# Patient Record
Sex: FEMALE | Race: WHITE | NOT HISPANIC OR LATINO | Employment: FULL TIME | URBAN - METROPOLITAN AREA
[De-identification: names, ages, dates, MRNs, and addresses within clinical notes are randomized per-mention and may not be internally consistent; named-entity substitution may affect disease eponyms.]

---

## 2017-03-24 ENCOUNTER — HOSPITAL ENCOUNTER (OUTPATIENT)
Dept: RADIOLOGY | Facility: HOSPITAL | Age: 52
Discharge: HOME | End: 2017-03-24
Attending: NURSE PRACTITIONER
Payer: COMMERCIAL

## 2017-03-24 ENCOUNTER — OFFICE VISIT (OUTPATIENT)
Dept: INTERNAL MEDICINE | Facility: CLINIC | Age: 52
End: 2017-03-24
Payer: COMMERCIAL

## 2017-03-24 ENCOUNTER — OFFICE VISIT (OUTPATIENT)
Dept: ORTHOPEDICS | Facility: CLINIC | Age: 52
End: 2017-03-24
Payer: COMMERCIAL

## 2017-03-24 VITALS
HEIGHT: 67 IN | SYSTOLIC BLOOD PRESSURE: 120 MMHG | BODY MASS INDEX: 29.34 KG/M2 | TEMPERATURE: 97 F | HEART RATE: 83 BPM | DIASTOLIC BLOOD PRESSURE: 68 MMHG | OXYGEN SATURATION: 99 % | WEIGHT: 186.94 LBS

## 2017-03-24 VITALS
HEIGHT: 66 IN | WEIGHT: 185.19 LBS | BODY MASS INDEX: 29.76 KG/M2 | HEART RATE: 82 BPM | DIASTOLIC BLOOD PRESSURE: 75 MMHG | SYSTOLIC BLOOD PRESSURE: 115 MMHG

## 2017-03-24 DIAGNOSIS — M79.641 RIGHT HAND PAIN: ICD-10-CM

## 2017-03-24 DIAGNOSIS — T14.8XXA SKIN AVULSION: ICD-10-CM

## 2017-03-24 DIAGNOSIS — M79.644 PAIN OF FINGER OF RIGHT HAND: ICD-10-CM

## 2017-03-24 DIAGNOSIS — M25.531 RIGHT WRIST PAIN: ICD-10-CM

## 2017-03-24 DIAGNOSIS — S63.501A RIGHT WRIST SPRAIN, INITIAL ENCOUNTER: Primary | ICD-10-CM

## 2017-03-24 DIAGNOSIS — F41.9 ANXIETY: ICD-10-CM

## 2017-03-24 DIAGNOSIS — S63.612A SPRAIN OF RIGHT MIDDLE FINGER, INITIAL ENCOUNTER: ICD-10-CM

## 2017-03-24 DIAGNOSIS — S63.614A SPRAIN OF RIGHT RING FINGER, INITIAL ENCOUNTER: ICD-10-CM

## 2017-03-24 DIAGNOSIS — M79.641 RIGHT HAND PAIN: Primary | ICD-10-CM

## 2017-03-24 PROCEDURE — 99213 OFFICE O/P EST LOW 20 MIN: CPT | Mod: S$GLB,,, | Performed by: NURSE PRACTITIONER

## 2017-03-24 PROCEDURE — 3078F DIAST BP <80 MM HG: CPT | Mod: S$GLB,,, | Performed by: PHYSICIAN ASSISTANT

## 2017-03-24 PROCEDURE — 3078F DIAST BP <80 MM HG: CPT | Mod: S$GLB,,, | Performed by: NURSE PRACTITIONER

## 2017-03-24 PROCEDURE — 73130 X-RAY EXAM OF HAND: CPT | Mod: TC,PO,RT

## 2017-03-24 PROCEDURE — 99203 OFFICE O/P NEW LOW 30 MIN: CPT | Mod: S$GLB,,, | Performed by: PHYSICIAN ASSISTANT

## 2017-03-24 PROCEDURE — 1160F RVW MEDS BY RX/DR IN RCRD: CPT | Mod: S$GLB,,, | Performed by: NURSE PRACTITIONER

## 2017-03-24 PROCEDURE — 1160F RVW MEDS BY RX/DR IN RCRD: CPT | Mod: S$GLB,,, | Performed by: PHYSICIAN ASSISTANT

## 2017-03-24 PROCEDURE — 3074F SYST BP LT 130 MM HG: CPT | Mod: S$GLB,,, | Performed by: NURSE PRACTITIONER

## 2017-03-24 PROCEDURE — 73130 X-RAY EXAM OF HAND: CPT | Mod: 26,RT,, | Performed by: RADIOLOGY

## 2017-03-24 PROCEDURE — 3074F SYST BP LT 130 MM HG: CPT | Mod: S$GLB,,, | Performed by: PHYSICIAN ASSISTANT

## 2017-03-24 PROCEDURE — 73110 X-RAY EXAM OF WRIST: CPT | Mod: TC,PO,RT

## 2017-03-24 PROCEDURE — 99999 PR PBB SHADOW E&M-EST. PATIENT-LVL V: CPT | Mod: PBBFAC,,, | Performed by: NURSE PRACTITIONER

## 2017-03-24 PROCEDURE — 73110 X-RAY EXAM OF WRIST: CPT | Mod: 26,RT,, | Performed by: RADIOLOGY

## 2017-03-24 PROCEDURE — 99999 PR PBB SHADOW E&M-EST. PATIENT-LVL III: CPT | Mod: PBBFAC,,, | Performed by: PHYSICIAN ASSISTANT

## 2017-03-24 RX ORDER — MELOXICAM 15 MG/1
15 TABLET ORAL DAILY
Qty: 30 TABLET | Refills: 0 | Status: SHIPPED | OUTPATIENT
Start: 2017-03-24 | End: 2018-01-05

## 2017-03-24 RX ORDER — MUPIROCIN 20 MG/G
OINTMENT TOPICAL 3 TIMES DAILY
Qty: 30 G | Refills: 0 | Status: SHIPPED | OUTPATIENT
Start: 2017-03-24 | End: 2017-04-03

## 2017-03-24 RX ORDER — ALPRAZOLAM 0.25 MG/1
0.25 TABLET ORAL 2 TIMES DAILY PRN
Qty: 15 TABLET | Refills: 0 | Status: SHIPPED | OUTPATIENT
Start: 2017-03-24 | End: 2018-01-05
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
## 2017-03-24 NOTE — MR AVS SNAPSHOT
[facility]  [address] [place] [address]  Phone: [phone]  Fax: [phone]                  Lola Nickerson Maria Victoria   3/24/2017 11:00 AM   Office Visit    Description:  Female : 1965   Provider:  PURA Small   Department:  [facility]           Reason for Visit     fell on right hand           Diagnoses this Visit        Comments    Right hand pain    -  Primary     Pain of finger of right hand         Skin avulsion         Right wrist pain         Closed nondisplaced fracture of phalanx of finger, unspecified finger, unspecified phalanx, initial encounter         Anxiety                To Do List           Future Appointments        Provider Department Dept Phone    3/24/2017 12:15 PM [facility] XR2 [facility] [phone]    3/24/2017 1:30 PM Pamella Macario PA-C [facility] [phone]      Goals (5 Years of Data)     None      Follow-Up and Disposition     Return if symptoms worsen or fail to improve.       These Medications        Disp Refills Start End    mupirocin (BACTROBAN) 2 % ointment 30 g 0 3/24/2017 4/3/2017    Apply topically 3 (three) times daily. - Topical (Top)    Pharmacy: [facility] [address] [place] [address] [address] [facility] Ph #: [phone]       alprazolam (XANAX) 0.25 MG tablet 15 tablet 0 3/24/2017     Take 1 tablet (0.25 mg total) by mouth 2 (two) times daily as needed for Anxiety. - Oral    Pharmacy: [facility] [address] [place] [address] [address] AT [facility] Ph #: [phone]         [facility] On Call     [facility] On Call Nurse Care Line -  Assistance  Registered nurses in the [facility] Center provide clinical advisement, health education, appointment booking, and other advisory services.  Call for this free service at [phone].             Medications           Message  "regarding Medications     Verify the changes and/or additions to your medication regime listed below are the same as discussed with your clinician today.  If any of these changes or additions are incorrect, please notify your healthcare provider.        START taking these NEW medications        Refills    mupirocin (BACTROBAN) 2 % ointment 0    Sig: Apply topically 3 (three) times daily.    Class: Normal    Route: Topical (Top)    alprazolam (XANAX) 0.25 MG tablet 0    Sig: Take 1 tablet (0.25 mg total) by mouth 2 (two) times daily as needed for Anxiety.    Class: Print    Route: Oral           Verify that the below list of medications is an accurate representation of the medications you are currently taking.  If none reported, the list may be blank. If incorrect, please contact your healthcare provider. Carry this list with you in case of emergency.           Current Medications     alprazolam (XANAX) 0.25 MG tablet Take 1 tablet (0.25 mg total) by mouth 2 (two) times daily as needed for Anxiety.    aspirin (ECOTRIN) 81 MG EC tablet Take 81 mg by mouth once daily.    cyclobenzaprine (FLEXERIL) 10 MG tablet Take 10 mg by mouth every evening.     lisinopril-hydrochlorothiazide (PRINZIDE,ZESTORETIC) 20-12.5 mg per tablet TAKE 1 TABLET BY MOUTH EVERY DAY    lisinopril-hydrochlorothiazide (PRINZIDE,ZESTORETIC) 20-12.5 mg per tablet TAKE 1 TABLET BY MOUTH EVERY DAY    mupirocin (BACTROBAN) 2 % ointment Apply topically 3 (three) times daily.           Clinical Reference Information           Your Vitals Were     BP Pulse Temp Height    120/68 (BP Location: Right arm, Patient Position: Sitting, BP Method: Manual) 83 97.2 °F (36.2 °C) (Tympanic) 5' 6.5" (1.689 m)    Weight Last Period SpO2 BMI    84.8 kg (186 lb 15.2 oz) 03/10/2017 99% 29.72 kg/m2      Blood Pressure          Most Recent Value    BP  120/68      Allergies as of 3/24/2017     Flagyl [Metronidazole Hcl]    Norvasc [Amlodipine]    Wellbutrin [Bupropion Hcl]    "   Immunizations Administered on Date of Encounter - 3/24/2017     None      Orders Placed During Today's Visit      Normal Orders This Visit    Ambulatory referral to Orthopedics     Ambulatory referral to Orthopedics     Future Labs/Procedures Expected by Expires    X-Ray Hand 3 view Right  3/24/2017 6/22/2017    X-Ray Wrist 2 View Right  3/24/2017 6/22/2017      Smoking Cessation     If you would like to quit smoking:   You may be eligible for free services if you are a [place] resident and started smoking cigarettes before September 1, 1988.  Call the Smoking Cessation Trust ([employer]) toll free at (243) 955-8884 or (377) 936-5914.   Call 8-872-QUIT-NOW if you do not meet the above criteria.            Language Assistance Services     ATTENTION: Language assistance services are available, free of charge. Please call [phone].      ATENCIÓN: Si robyn deleon, tiene a tesfaye disposición servicios gratuitos de asistencia lingüística. Llame al [phone].     CHÚ Ý: N?u b?n nói Ti?ng Vi?t, có các d?ch v? h? tr? ngôn ng? mi?n phí dành cho b?n. G?i s? [phone].         [facility] complies with applicable Federal civil rights laws and does not discriminate on the basis of race, color, national origin, age, disability, or sex.

## 2017-03-24 NOTE — MR AVS SNAPSHOT
[facility]  [address] [place] [address]  Phone: [phone]  Fax: [phone]                  Lola Noriegabb   3/24/2017 1:30 PM   Office Visit    Description:  Female : 1965   Provider:  Pamella Macario PA-C   Department:  [facility]           Reason for Visit     Right Hand - Pain           Diagnoses this Visit        Comments    Right wrist sprain, initial encounter    -  Primary     Sprain of right middle finger, initial encounter         Sprain of right ring finger, initial encounter                To Do List           Future Appointments        Provider Department Dept Phone    2017 11:15 AM Pamella Macario PA-C [facility] [phone]      Goals (5 Years of Data)     None       These Medications        Disp Refills Start End    meloxicam (MOBIC) 15 MG tablet 30 tablet 0 3/24/2017     Take 1 tablet (15 mg total) by mouth once daily. - Oral    Pharmacy: [facility] [address] [place] [address] [address] [facility] Ph #: [phone]         [facility] On Call     [facility] On Call Nurse [facility] -  Assistance  Registered nurses in the [facility] provide clinical advisement, health education, appointment booking, and other advisory services.  Call for this free service at [phone].             Medications           Message regarding Medications     Verify the changes and/or additions to your medication regime listed below are the same as discussed with your clinician today.  If any of these changes or additions are incorrect, please notify your healthcare provider.        START taking these NEW medications        Refills    meloxicam (MOBIC) 15 MG tablet 0    Sig: Take 1 tablet (15 mg total) by mouth once daily.    Class: Normal    Route: Oral           Verify that the below list of medications is an accurate representation of the medications you are currently  "taking.  If none reported, the list may be blank. If incorrect, please contact your healthcare provider. Carry this list with you in case of emergency.           Current Medications     alprazolam (XANAX) 0.25 MG tablet Take 1 tablet (0.25 mg total) by mouth 2 (two) times daily as needed for Anxiety.    aspirin (ECOTRIN) 81 MG EC tablet Take 81 mg by mouth once daily.    cyclobenzaprine (FLEXERIL) 10 MG tablet Take 10 mg by mouth every evening.     lisinopril-hydrochlorothiazide (PRINZIDE,ZESTORETIC) 20-12.5 mg per tablet TAKE 1 TABLET BY MOUTH EVERY DAY    meloxicam (MOBIC) 15 MG tablet Take 1 tablet (15 mg total) by mouth once daily.    mupirocin (BACTROBAN) 2 % ointment Apply topically 3 (three) times daily.           Clinical Reference Information           Your Vitals Were     BP Pulse Height Weight Last Period BMI    115/75 (BP Location: Left arm, Patient Position: Sitting, BP Method: Automatic) 82 5' 6" (1.676 m) 84 kg (185 lb 3 oz) 03/10/2017 29.89 kg/m2      Blood Pressure          Most Recent Value    BP  115/75      Allergies as of 3/24/2017     Flagyl [Metronidazole Hcl]    Norvasc [Amlodipine]    Wellbutrin [Bupropion Hcl]      Immunizations Administered on Date of Encounter - 3/24/2017     None      Smoking Cessation     If you would like to quit smoking:   You may be eligible for free services if you are a [place] resident and started smoking cigarettes before September 1, 1988.  Call the Smoking Cessation Trust ([facility]) toll free at (930) 763-4963 or (575) 036-4201.   Call 1-800-QUIT-NOW if you do not meet the above criteria.            Language Assistance Services     ATTENTION: Language assistance services are available, free of charge. Please call [phone].      ATENCIÓN: Si habla adrienne, tiene a tesfaye disposición servicios gratuitos de asistencia lingüística. Llame al [phone].     CHÚ Ý: N?u b?n nói Ti?ng Vi?t, có các d?ch v? h? tr? ngôn ng? mi?n phí dành cho b?n. G?i s? " [phone].         [facility] complies with applicable Federal civil rights laws and does not discriminate on the basis of race, color, national origin, age, disability, or sex.

## 2017-03-24 NOTE — LETTER
March 24, 2017      Katarzyna Britt, [facility]  [address] [place] [address]           [facility]  [address] [place] [address]  Phone: [phone]  Fax: [phone]          Patient: Lola Irene   MR Number: [id number]   YOB: 1965   Date of Visit: 3/24/2017       Dear Katarzyna Britt:    Thank you for referring Lola Irene to me for evaluation. Attached you will find relevant portions of my assessment and plan of care.    If you have questions, please do not hesitate to call me. I look forward to following Lola Irene along with you.    Sincerely,    BRIAN Gross  CC:  No Recipients    If you would like to receive this communication electronically, please contact [email or web address] or [phone] to request more information on [facility] Link access.    For providers and/or their staff who would like to refer a patient to [facility], please contact us through our one-stop-shop provider referral line, [facility] , at [phone].    If you feel you have received this communication in error or would no longer like to receive these types of communications, please e-mail [email or web address]

## 2017-03-24 NOTE — PROGRESS NOTES
Subjective:      Patient ID: Lola Irene is a 51 y.o. female.    Chief Complaint: Pain of the Right Hand      HPI: Lola Irene  is a 51 y.o. female who c/o Pain of the Right Hand   for duration of one week.  She had a fall one week ago injuring the fourth finger.  She does on to tell me that the nurse practitioner who saw her at [facility] after our saw a fracture of the finger, however, when the radiologist's read the report, he did not know take a fracture.  She has been using a splint, but unfortunately she had another injury today when she slipped and fell.  She was seen downstairs by Katarzyna Britt who set her up to me for further orthopedic evaluation.  Patient voiced some frustration at having to be seen today and pain to different co-pays today.  Her pain level is 5 out of 10 in severity.  Quality is aching and constant.  It was improved on Norco.  It was also improved with immobilization.  Aggravating factors include movement.  The worst of her pain is the right ring finger.  However, she also has some discomfort in the right middle finger as well as the right wrist.  She complains of decreased range of motion in both the fourth and third fingers.  She complains of associated swelling and ecchymosis in the third and fourth fingers as well.    Review of Systems   Constitution: Negative for fever.   HENT: Negative for headaches.    Cardiovascular: Negative for chest pain.   Respiratory: Negative for cough and shortness of breath.    Skin: Negative for rash.   Musculoskeletal: Positive for joint pain, joint swelling and stiffness.   Gastrointestinal: Negative for heartburn.   Neurological: Negative for numbness.         Objective:        General    Nursing note and vitals reviewed.  Constitutional: She is oriented to person, place, and time. She appears well-developed and well-nourished.   HENT:   Head: Normocephalic and atraumatic.   Eyes: EOM are normal.   Cardiovascular: Normal rate and regular rhythm.     Pulmonary/Chest: Effort normal.   Abdominal: Soft.   Neurological: She is alert and oriented to person, place, and time.   Psychiatric: She has a normal mood and affect. Her behavior is normal.             Right Hand/Wrist Exam     Tests     Atrophy   Thenar:  negative  Hypothenar:  negative  Intrinsic:  negative  1st Dorsal Interosseous: negative    Other     Neuorologic Exam    Median Distribution: normal  Ulnar Distribution: normal  Radial Distribution: normal    Comments:  She is neurovascularly intact.  She has a 2+ radial pulse.  Sensation is intact to all digits.  She has swelling and ecchymosis noted within the right finger.  To a lesser extent she has swelling in the right total finger.  She has no swelling within the wrist.  She has some tenderness to palpation along the dorsal aspect of the distal radius.  She has no snuffbox tenderness.  No tenderness over the ulna nor the TFCC.  Mild diffuse tenderness within the carpals of the wrist.  She has no tenderness over the metacarpals of the hand.  She has exquisite tenderness to palpation over the phalanx of the right ring finger.  She has decreased range of motion in the third and fourth fingers, however, flexion and extension are intact to the MCP, PIP, and DIP joints.          Vascular Exam       Capillary Refill  Right Hand: normal capillary refill            Xray:   Right hand and wrist from today images and report were reviewed today.  I agree with the radiologist's interpretation.  There is no evidence to suggest acute fracture or dislocation.  The joint spaces appear to be well-maintained.  On independent review of the x-ray, she does have a cortical irregularity on the radial aspect of the proximal phalanx of the right ring finger.  There is no obvious fracture line in this area.  However, it does correlate clinically to her area of maximal tenderness.    Assessment:       Encounter Diagnoses   Name Primary?    Right wrist sprain, initial  encounter Yes    Sprain of right middle finger, initial encounter     Sprain of right ring finger, initial encounter           Plan:       Lola was seen today for pain.    Diagnoses and all orders for this visit:    Right wrist sprain, initial encounter  -     meloxicam (MOBIC) 15 MG tablet; Take 1 tablet (15 mg total) by mouth once daily.    Sprain of right middle finger, initial encounter  -     meloxicam (MOBIC) 15 MG tablet; Take 1 tablet (15 mg total) by mouth once daily.    Sprain of right ring finger, initial encounter  -     meloxicam (MOBIC) 15 MG tablet; Take 1 tablet (15 mg total) by mouth once daily.    Lola is in today to be evaluated for new problems as above.  I recommend a wrist splint for immobilization to help with pain in the wrist.  I also recommend german taping the third and fourth fingers.  She has a metal finger splint that she can use for more protection of the right ring finger for the next couple of days.  Ultimately I like to get her into german taping and working gentle motion of the fingers.  I like to see her back in the office in 2 weeks.  If her pain is not improving, I would recommend repeat x-rays of the hand to further evaluate the right ring finger.  In the meantime, I have prescribed meloxicam 1 by mouth daily with food.  If she has any problems prior to follow-up, she will notify the office.  Patient verbalizes understanding and agrees with the above plan.    Return in about 2 weeks (around 4/7/2017).          The patient understands, chooses and consents to this plan and accepts all   the risks which include but are not limited to the risks mentioned above.     Disclaimer: This note was prepared using a voice recognition system and is likely to have sound alike errors within the text.

## 2017-07-05 RX ORDER — LISINOPRIL AND HYDROCHLOROTHIAZIDE 12.5; 2 MG/1; MG/1
TABLET ORAL
Qty: 30 TABLET | Refills: 1 | Status: SHIPPED | OUTPATIENT
Start: 2017-07-05 | End: 2017-09-08 | Stop reason: SDUPTHER

## 2017-09-08 NOTE — TELEPHONE ENCOUNTER
"5/16  Pt states "yea, I know, are you going to fill this or not? if not I will just stop taking it. I am not coming back in until the balance I owe for the stupid finger incident is paid off."  "

## 2017-09-11 RX ORDER — LISINOPRIL AND HYDROCHLOROTHIAZIDE 12.5; 2 MG/1; MG/1
1 TABLET ORAL DAILY
Qty: 30 TABLET | Refills: 1 | Status: SHIPPED | OUTPATIENT
Start: 2017-09-11 | End: 2017-11-13 | Stop reason: SDUPTHER

## 2017-11-13 ENCOUNTER — PATIENT MESSAGE (OUTPATIENT)
Dept: INTERNAL MEDICINE | Facility: CLINIC | Age: 52
End: 2017-11-13

## 2017-11-13 RX ORDER — LISINOPRIL AND HYDROCHLOROTHIAZIDE 12.5; 2 MG/1; MG/1
1 TABLET ORAL DAILY
Qty: 30 TABLET | Refills: 1 | Status: SHIPPED | OUTPATIENT
Start: 2017-11-13 | End: 2017-12-11 | Stop reason: SDUPTHER

## 2017-11-13 RX ORDER — LISINOPRIL AND HYDROCHLOROTHIAZIDE 12.5; 2 MG/1; MG/1
1 TABLET ORAL DAILY
Qty: 30 TABLET | Refills: 0 | Status: SHIPPED | OUTPATIENT
Start: 2017-11-13 | End: 2018-01-05 | Stop reason: SDUPTHER

## 2017-12-11 RX ORDER — LISINOPRIL AND HYDROCHLOROTHIAZIDE 12.5; 2 MG/1; MG/1
1 TABLET ORAL DAILY
Qty: 30 TABLET | Refills: 1 | Status: SHIPPED | OUTPATIENT
Start: 2017-12-11 | End: 2018-01-05 | Stop reason: SDUPTHER

## 2018-01-05 ENCOUNTER — OFFICE VISIT (OUTPATIENT)
Dept: INTERNAL MEDICINE | Facility: CLINIC | Age: 53
End: 2018-01-05
Payer: COMMERCIAL

## 2018-01-05 VITALS
TEMPERATURE: 98 F | DIASTOLIC BLOOD PRESSURE: 74 MMHG | OXYGEN SATURATION: 98 % | WEIGHT: 186.75 LBS | SYSTOLIC BLOOD PRESSURE: 110 MMHG | HEART RATE: 75 BPM | BODY MASS INDEX: 29.31 KG/M2 | HEIGHT: 67 IN

## 2018-01-05 DIAGNOSIS — Z12.11 SCREEN FOR COLON CANCER: ICD-10-CM

## 2018-01-05 DIAGNOSIS — I10 ESSENTIAL HYPERTENSION: ICD-10-CM

## 2018-01-05 DIAGNOSIS — Z00.00 ROUTINE HEALTH MAINTENANCE: Primary | ICD-10-CM

## 2018-01-05 DIAGNOSIS — F17.200 SMOKER: ICD-10-CM

## 2018-01-05 PROCEDURE — 99999 PR PBB SHADOW E&M-EST. PATIENT-LVL III: CPT | Mod: PBBFAC,,, | Performed by: FAMILY MEDICINE

## 2018-01-05 PROCEDURE — 99396 PREV VISIT EST AGE 40-64: CPT | Mod: S$GLB,,, | Performed by: FAMILY MEDICINE

## 2018-01-05 RX ORDER — TRAZODONE HYDROCHLORIDE 50 MG/1
50 TABLET ORAL NIGHTLY PRN
Qty: 90 TABLET | Refills: 3 | Status: SHIPPED | OUTPATIENT
Start: 2018-01-05 | End: 2019-08-20

## 2018-01-05 RX ORDER — LISINOPRIL AND HYDROCHLOROTHIAZIDE 12.5; 2 MG/1; MG/1
1 TABLET ORAL DAILY
Qty: 90 TABLET | Refills: 3 | Status: SHIPPED | OUTPATIENT
Start: 2018-01-05 | End: 2019-08-20
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
## 2018-01-06 LAB
BUN SERPL-MCNC: 15 MG/DL (ref 6–24)
BUN/CREAT SERPL: 15 (ref 9–23)
CALCIUM SERPL-MCNC: 10.3 MG/DL (ref 8.7–10.2)
CHLORIDE SERPL-SCNC: 95 MMOL/L (ref 96–106)
CHOLEST SERPL-MCNC: 206 MG/DL (ref 100–199)
CO2 SERPL-SCNC: 25 MMOL/L (ref 18–29)
CREAT SERPL-MCNC: 1 MG/DL (ref 0.57–1)
EGFR IF AFRICAN AMERICAN: 75 ML/MIN/1.73
EST. GFR  (NON AFRICAN AMERICAN): 65 ML/MIN/1.73
GLUCOSE SERPL-MCNC: 103 MG/DL (ref 65–99)
HDLC SERPL-MCNC: 55 MG/DL
LDLC SERPL CALC-MCNC: 121 MG/DL (ref 0–99)
POTASSIUM SERPL-SCNC: 4.4 MMOL/L (ref 3.5–5.2)
SODIUM SERPL-SCNC: 137 MMOL/L (ref 134–144)
TRIGL SERPL-MCNC: 152 MG/DL (ref 0–149)
VLDLC SERPL CALC-MCNC: 30 MG/DL (ref 5–40)

## 2019-03-01 ENCOUNTER — PATIENT OUTREACH (OUTPATIENT)
Dept: ADMINISTRATIVE | Facility: HOSPITAL | Age: 54
End: 2019-03-01

## 2019-03-01 NOTE — PROGRESS NOTES
Attempted to contact patient regarding scheduling for physical/annual exam.   No answer. Left a detailed voicemail message including call back number.

## 2019-04-01 ENCOUNTER — OFFICE VISIT (OUTPATIENT)
Dept: URGENT CARE | Facility: CLINIC | Age: 54
End: 2019-04-01
Payer: COMMERCIAL

## 2019-04-01 VITALS
BODY MASS INDEX: 30.24 KG/M2 | SYSTOLIC BLOOD PRESSURE: 138 MMHG | OXYGEN SATURATION: 98 % | HEART RATE: 81 BPM | HEIGHT: 67 IN | DIASTOLIC BLOOD PRESSURE: 92 MMHG | TEMPERATURE: 99 F | WEIGHT: 192.69 LBS

## 2019-04-01 DIAGNOSIS — L03.113 CELLULITIS OF RIGHT UPPER EXTREMITY: ICD-10-CM
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
## 2019-04-01 PROCEDURE — 3075F SYST BP GE 130 - 139MM HG: CPT | Mod: CPTII,S$GLB,, | Performed by: FAMILY MEDICINE

## 2019-04-01 PROCEDURE — 99214 PR OFFICE/OUTPT VISIT, EST, LEVL IV, 30-39 MIN: ICD-10-PCS | Mod: 25,S$GLB,, | Performed by: FAMILY MEDICINE

## 2019-04-01 PROCEDURE — 99214 OFFICE O/P EST MOD 30 MIN: CPT | Mod: 25,S$GLB,, | Performed by: FAMILY MEDICINE

## 2019-04-01 PROCEDURE — 99999 PR PBB SHADOW E&M-EST. PATIENT-LVL IV: CPT | Mod: PBBFAC,,, | Performed by: FAMILY MEDICINE

## 2019-04-01 PROCEDURE — 99999 PR PBB SHADOW E&M-EST. PATIENT-LVL IV: ICD-10-PCS | Mod: PBBFAC,,, | Performed by: FAMILY MEDICINE

## 2019-04-01 PROCEDURE — 3075F PR MOST RECENT SYSTOLIC BLOOD PRESS GE 130-139MM HG: ICD-10-PCS | Mod: CPTII,S$GLB,, | Performed by: FAMILY MEDICINE

## 2019-04-01 PROCEDURE — 3080F DIAST BP >= 90 MM HG: CPT | Mod: CPTII,S$GLB,, | Performed by: FAMILY MEDICINE

## 2019-04-01 PROCEDURE — 3008F PR BODY MASS INDEX (BMI) DOCUMENTED: ICD-10-PCS | Mod: CPTII,S$GLB,, | Performed by: FAMILY MEDICINE

## 2019-04-01 PROCEDURE — 3008F BODY MASS INDEX DOCD: CPT | Mod: CPTII,S$GLB,, | Performed by: FAMILY MEDICINE

## 2019-04-01 PROCEDURE — 96372 THER/PROPH/DIAG INJ SC/IM: CPT | Mod: S$GLB,,, | Performed by: FAMILY MEDICINE

## 2019-04-01 PROCEDURE — 96372 PR INJECTION,THERAP/PROPH/DIAG2ST, IM OR SUBCUT: ICD-10-PCS | Mod: S$GLB,,, | Performed by: FAMILY MEDICINE

## 2019-04-01 PROCEDURE — 3080F PR MOST RECENT DIASTOLIC BLOOD PRESSURE >= 90 MM HG: ICD-10-PCS | Mod: CPTII,S$GLB,, | Performed by: FAMILY MEDICINE

## 2019-04-01 RX ORDER — SULFAMETHOXAZOLE AND TRIMETHOPRIM 800; 160 MG/1; MG/1
1 TABLET ORAL 2 TIMES DAILY
Qty: 14 TABLET | Refills: 0 | Status: SHIPPED | OUTPATIENT
Start: 2019-04-01 | End: 2019-04-01 | Stop reason: CLARIF

## 2019-04-01 RX ORDER — DOXYCYCLINE 100 MG/1
100 CAPSULE ORAL EVERY 12 HOURS
Qty: 14 CAPSULE | Refills: 0 | Status: SHIPPED | OUTPATIENT
Start: 2019-04-01 | End: 2019-04-08

## 2019-04-01 RX ORDER — METHYLPREDNISOLONE ACETATE 80 MG/ML
80 INJECTION, SUSPENSION INTRA-ARTICULAR; INTRALESIONAL; INTRAMUSCULAR; SOFT TISSUE ONCE
Status: COMPLETED | OUTPATIENT
Start: 2019-04-01 | End: 2019-04-01

## 2019-04-01 RX ADMIN — METHYLPREDNISOLONE ACETATE 80 MG: 80 INJECTION, SUSPENSION INTRA-ARTICULAR; INTRALESIONAL; INTRAMUSCULAR; SOFT TISSUE at 12:04

## 2019-04-01 NOTE — LETTER
April 1, 2019      [facility]  [address]  [place] [address]  Phone: [phone]  Fax: [phone]       Patient: Lola Irene   YOB: 1965  Date of Visit: 04/01/2019    To Whom It May Concern:    Denisse Irene  was at [facility] on 04/01/2019. She may return to work/school on 4/2 with no restrictions. If you have any questions or concerns, or if I can be of further assistance, please do not hesitate to contact me.    Sincerely,    Celina Darnell MD

## 2019-04-01 NOTE — PATIENT INSTRUCTIONS
Over the counter benadryl 25 mg, 1-2 pills every 6 hours for itch  Refrain from scratching, use ice pack to sooth  Work excuse today

## 2019-04-01 NOTE — PROGRESS NOTES
"Subjective:       Patient ID: Lola Irene is a 53 y.o. female.    Chief Complaint: Insect Bite (located on right elbow )    BP (!) 138/92 (BP Location: Right arm, Patient Position: Sitting, BP Method: Medium (Manual))   Pulse 81   Temp 98.9 °F (37.2 °C) (Tympanic)   Ht 5' 7" (1.702 m)   Wt 87.4 kg (192 lb 10.9 oz)   SpO2 98%   BMI 30.18 kg/m²     HPI  Bitten on right elbow by some insect. Very itchy. Scratched all night. Now the area is hard and the redness is spreading    Review of Systems   Constitutional: Negative for chills and fever.   HENT: Negative for congestion and trouble swallowing.    Respiratory: Negative for shortness of breath.    Gastrointestinal: Negative for nausea and vomiting.   Skin: Positive for color change, rash and wound.       Objective:      Physical Exam   Constitutional: She is oriented to person, place, and time. She appears well-developed and well-nourished. No distress.   HENT:   Head: Normocephalic and atraumatic.   Eyes: Pupils are equal, round, and reactive to light. EOM are normal.   Neurological: She is alert and oriented to person, place, and time. No cranial nerve deficit.   Skin: Skin is warm and dry. She is not diaphoretic.   Right elbow:   3 by 3 cm erythematous induration with a center scab.   Irregular shaped erythema without induration roughly 10 by 8 cm extending to inner upper arm. A smaller area of erythema without induration of 5 by 7 cm extending to lower arm  All said area intensely pruritic   Nursing note and vitals reviewed.      Assessment:       1. Insect bite, initial encounter    2. Cellulitis of right upper extremity        Plan:     Lola was seen today for insect bite.    Diagnoses and all orders for this visit:    Insect bite, initial encounter  -     methylPREDNISolone acetate injection 80 mg    Cellulitis of right upper extremity  -     Discontinue: sulfamethoxazole-trimethoprim 800-160mg (BACTRIM DS) 800-160 mg Tab; Take 1 tablet by mouth " 2 (two) times daily. for 7 days  -     doxycycline (VIBRAMYCIN) 100 MG Cap; Take 1 capsule (100 mg total) by mouth every 12 (twelve) hours. for 7 days      Over the counter benadryl 25 mg, 1-2 pills every 6 hours for itch  Refrain from scratching, use ice pack to sooth  Work excuse today

## 2019-04-03 ENCOUNTER — PATIENT OUTREACH (OUTPATIENT)
Dept: ADMINISTRATIVE | Facility: HOSPITAL | Age: 54
End: 2019-04-03

## 2019-04-03 NOTE — PROGRESS NOTES
"Contacted patient.  Patient was irate. Patient states, " I'm in the middle of something. I will get back to you. I'm at work." Patient hung up.  "

## 2019-04-10 ENCOUNTER — PATIENT OUTREACH (OUTPATIENT)
Dept: ADMINISTRATIVE | Facility: HOSPITAL | Age: 54
End: 2019-04-10

## 2019-04-10 NOTE — PROGRESS NOTES
I have attempted without success to contact pt re scheduling an appt no answer, unable to leave Vm.

## 2019-07-30 ENCOUNTER — PATIENT OUTREACH (OUTPATIENT)
Dept: ADMINISTRATIVE | Facility: HOSPITAL | Age: 54
End: 2019-07-30

## 2019-08-06 ENCOUNTER — PATIENT OUTREACH (OUTPATIENT)
Dept: ADMINISTRATIVE | Facility: HOSPITAL | Age: 54
End: 2019-08-06

## 2019-08-20 ENCOUNTER — OFFICE VISIT (OUTPATIENT)
Dept: INTERNAL MEDICINE | Facility: CLINIC | Age: 54
End: 2019-08-20
Payer: COMMERCIAL

## 2019-08-20 VITALS
HEIGHT: 67 IN | TEMPERATURE: 96 F | WEIGHT: 189.63 LBS | SYSTOLIC BLOOD PRESSURE: 136 MMHG | DIASTOLIC BLOOD PRESSURE: 90 MMHG | HEART RATE: 73 BPM | BODY MASS INDEX: 29.76 KG/M2 | OXYGEN SATURATION: 98 %

## 2019-08-20 DIAGNOSIS — F43.21 GRIEVING: ICD-10-CM

## 2019-08-20 DIAGNOSIS — I10 ESSENTIAL HYPERTENSION: Primary | ICD-10-CM

## 2019-08-20 PROCEDURE — 99999 PR PBB SHADOW E&M-EST. PATIENT-LVL III: CPT | Mod: PBBFAC,,, | Performed by: NURSE PRACTITIONER

## 2019-08-20 PROCEDURE — 99214 OFFICE O/P EST MOD 30 MIN: CPT | Mod: S$GLB,,, | Performed by: NURSE PRACTITIONER

## 2019-08-20 PROCEDURE — 3008F PR BODY MASS INDEX (BMI) DOCUMENTED: ICD-10-PCS | Mod: CPTII,S$GLB,, | Performed by: NURSE PRACTITIONER

## 2019-08-20 PROCEDURE — 3080F DIAST BP >= 90 MM HG: CPT | Mod: CPTII,S$GLB,, | Performed by: NURSE PRACTITIONER

## 2019-08-20 PROCEDURE — 99999 PR PBB SHADOW E&M-EST. PATIENT-LVL III: ICD-10-PCS | Mod: PBBFAC,,, | Performed by: NURSE PRACTITIONER

## 2019-08-20 PROCEDURE — 3008F BODY MASS INDEX DOCD: CPT | Mod: CPTII,S$GLB,, | Performed by: NURSE PRACTITIONER

## 2019-08-20 PROCEDURE — 3075F PR MOST RECENT SYSTOLIC BLOOD PRESS GE 130-139MM HG: ICD-10-PCS | Mod: CPTII,S$GLB,, | Performed by: NURSE PRACTITIONER

## 2019-08-20 PROCEDURE — 3075F SYST BP GE 130 - 139MM HG: CPT | Mod: CPTII,S$GLB,, | Performed by: NURSE PRACTITIONER

## 2019-08-20 PROCEDURE — 3080F PR MOST RECENT DIASTOLIC BLOOD PRESSURE >= 90 MM HG: ICD-10-PCS | Mod: CPTII,S$GLB,, | Performed by: NURSE PRACTITIONER

## 2019-08-20 PROCEDURE — 99214 PR OFFICE/OUTPT VISIT, EST, LEVL IV, 30-39 MIN: ICD-10-PCS | Mod: S$GLB,,, | Performed by: NURSE PRACTITIONER

## 2019-08-20 RX ORDER — OLMESARTAN MEDOXOMIL 5 MG/1
5 TABLET ORAL DAILY
Qty: 30 TABLET | Refills: 5 | Status: SHIPPED | OUTPATIENT
Start: 2019-08-20 | End: 2020-03-17 | Stop reason: SDUPTHER

## 2019-08-20 NOTE — LETTER
August 20, 2019                 [facility]  Internal Medicine  [address]  [place] [address]  Phone: [phone]  Fax: [phone]   August 20, 2019     Patient: Lola Irene   YOB: 1965   Date of Visit: 8/20/2019       To Whom it May Concern:    Lola Irene was seen in my clinic on 8/20/2019. She may return to work on 8/22/2019 or sooner if symptoms improve.     If you have any questions or concerns, please don't hesitate to call.    Sincerely,         Dolores Lazar NP

## 2019-08-20 NOTE — PROGRESS NOTES
Subjective:       Patient ID: Lola Irene is a 54 y.o. female.    Chief Complaint: Hypertension and Headache    Patient presents for hypertension.  Was lisinopril 20/12.5 mg but stopped last year after having low blood pressure readings (70/50).  Reports blood pressure started to change in April: multiple family deaths and stressful job.  Trying to help sister with grieving over her son (patient's nephew).  Currently drinking beer (3-6 beers/day) and smoking (1ppd).      Review of Systems   Constitutional: Negative for chills and fatigue.   Respiratory: Negative for cough and shortness of breath.    Cardiovascular: Negative for chest pain.   Musculoskeletal: Positive for arthralgias, myalgias and neck pain.   Skin: Negative for color change and wound.   Neurological: Positive for headaches.   Psychiatric/Behavioral: Positive for dysphoric mood and sleep disturbance.       Objective:      Physical Exam   Constitutional: She is oriented to person, place, and time. Vital signs are normal. She appears well-developed and well-nourished.   HENT:   Head: Normocephalic and atraumatic.   Neck: Normal range of motion.   Cardiovascular: Normal rate and regular rhythm.   Pulmonary/Chest: Effort normal and breath sounds normal.   Musculoskeletal: Normal range of motion.   Neurological: She is alert and oriented to person, place, and time.   Skin: Skin is warm.   Psychiatric: She has a normal mood and affect. Her behavior is normal.       Assessment:       1. Essential hypertension    2. Grieving        Plan:         Essential hypertension  -     Cancel: Lipid panel; Future; Expected date: 08/20/2019  -     Cancel: Basic metabolic panel; Future; Expected date: 08/20/2019  -     Cancel: TSH; Future; Expected date: 08/20/2019  -     Cancel: CBC auto differential; Future; Expected date: 08/20/2019  -     olmesartan (BENICAR) 5 MG Tab; Take 1 tablet (5 mg total) by mouth once daily.  Dispense: 30 tablet; Refill: 5  -     CBC  auto differential; Future; Expected date: 08/20/2019  -     TSH; Future; Expected date: 08/20/2019  -     Basic metabolic panel; Future; Expected date: 08/20/2019  -     Lipid panel; Future; Expected date: 08/20/2019    Grieving        Labs at [facility].  Will start Benicar 5 mg.  Can start with 1/2 tab then increase to whole if needed.  Will get blood pressure cuff to monitor at home. Declined internal psych referral for counseling.  Will reach out to counselor at [facility].  He offers free counseling.  Had signed up before but didn't go.  Understands that it would be beneficial.    Encouraged to decrease/stopped ETOH.

## 2019-08-21 ENCOUNTER — TELEPHONE (OUTPATIENT)
Dept: URGENT CARE | Facility: CLINIC | Age: 54
End: 2019-08-21

## 2019-08-21 LAB
BASOPHILS # BLD AUTO: 0 X10E3/UL (ref 0–0.2)
BASOPHILS NFR BLD AUTO: 0 %
BUN SERPL-MCNC: 10 MG/DL (ref 6–24)
BUN/CREAT SERPL: 12 (ref 9–23)
CALCIUM SERPL-MCNC: 9.8 MG/DL (ref 8.7–10.2)
CHLORIDE SERPL-SCNC: 100 MMOL/L (ref 96–106)
CHOLEST SERPL-MCNC: 177 MG/DL (ref 100–199)
CO2 SERPL-SCNC: 24 MMOL/L (ref 20–29)
CREAT SERPL-MCNC: 0.85 MG/DL (ref 0.57–1)
EOSINOPHIL # BLD AUTO: 0.1 X10E3/UL (ref 0–0.4)
EOSINOPHIL NFR BLD AUTO: 1 %
ERYTHROCYTE [DISTWIDTH] IN BLOOD BY AUTOMATED COUNT: 13.8 % (ref 12.3–15.4)
GLUCOSE SERPL-MCNC: 93 MG/DL (ref 65–99)
HCT VFR BLD AUTO: 44.4 % (ref 34–46.6)
HDLC SERPL-MCNC: 58 MG/DL
HGB BLD-MCNC: 14.8 G/DL (ref 11.1–15.9)
IMM GRANULOCYTES # BLD AUTO: 0 X10E3/UL (ref 0–0.1)
IMM GRANULOCYTES NFR BLD AUTO: 0 %
LDLC SERPL CALC-MCNC: 92 MG/DL (ref 0–99)
LYMPHOCYTES # BLD AUTO: 1.9 X10E3/UL (ref 0.7–3.1)
LYMPHOCYTES NFR BLD AUTO: 27 %
MCH RBC QN AUTO: 31.6 PG (ref 26.6–33)
MCHC RBC AUTO-ENTMCNC: 33.3 G/DL (ref 31.5–35.7)
MCV RBC AUTO: 95 FL (ref 79–97)
MONOCYTES # BLD AUTO: 0.4 X10E3/UL (ref 0.1–0.9)
MONOCYTES NFR BLD AUTO: 6 %
NEUTROPHILS # BLD AUTO: 4.5 X10E3/UL (ref 1.4–7)
NEUTROPHILS NFR BLD AUTO: 66 %
PLATELET # BLD AUTO: 232 X10E3/UL (ref 150–450)
POTASSIUM SERPL-SCNC: 4.5 MMOL/L (ref 3.5–5.2)
RBC # BLD AUTO: 4.69 X10E6/UL (ref 3.77–5.28)
SODIUM SERPL-SCNC: 140 MMOL/L (ref 134–144)
TRIGL SERPL-MCNC: 136 MG/DL (ref 0–149)
TSH SERPL DL<=0.005 MIU/L-ACNC: 0.87 UIU/ML (ref 0.45–4.5)
VLDLC SERPL CALC-MCNC: 27 MG/DL (ref 5–40)
WBC # BLD AUTO: 6.8 X10E3/UL (ref 3.4–10.8)

## 2019-10-22 ENCOUNTER — PATIENT MESSAGE (OUTPATIENT)
Dept: INTERNAL MEDICINE | Facility: CLINIC | Age: 54
End: 2019-10-22

## 2019-10-28 ENCOUNTER — PATIENT OUTREACH (OUTPATIENT)
Dept: ADMINISTRATIVE | Facility: HOSPITAL | Age: 54
End: 2019-10-28

## 2019-10-28 NOTE — PROGRESS NOTES
Health maintenance reviewed.  Care Everywhere checked. Immunizations reviewed and reconciled.  NO info Care Everywhere or Links. PREVISIT CHART AUDIT LETTER SENT VIA PATIENT PORTAL

## 2019-11-19 ENCOUNTER — OFFICE VISIT (OUTPATIENT)
Dept: INTERNAL MEDICINE | Facility: CLINIC | Age: 54
End: 2019-11-19
Payer: COMMERCIAL

## 2019-11-19 VITALS
WEIGHT: 192 LBS | HEART RATE: 101 BPM | SYSTOLIC BLOOD PRESSURE: 136 MMHG | DIASTOLIC BLOOD PRESSURE: 80 MMHG | OXYGEN SATURATION: 97 % | TEMPERATURE: 97 F | BODY MASS INDEX: 30.13 KG/M2 | HEIGHT: 67 IN

## 2019-11-19 DIAGNOSIS — K52.9 GASTROENTERITIS: Primary | ICD-10-CM

## 2019-11-19 DIAGNOSIS — R50.9 FEVER, UNSPECIFIED FEVER CAUSE: ICD-10-CM

## 2019-11-19 LAB
CTP QC/QA: YES
POC MOLECULAR INFLUENZA A AGN: NEGATIVE
POC MOLECULAR INFLUENZA B AGN: NEGATIVE

## 2019-11-19 PROCEDURE — 87502 POCT INFLUENZA A/B MOLECULAR: ICD-10-PCS | Mod: QW,S$GLB,, | Performed by: FAMILY MEDICINE

## 2019-11-19 PROCEDURE — 99213 OFFICE O/P EST LOW 20 MIN: CPT | Mod: S$GLB,,, | Performed by: FAMILY MEDICINE

## 2019-11-19 PROCEDURE — 3008F BODY MASS INDEX DOCD: CPT | Mod: CPTII,S$GLB,, | Performed by: FAMILY MEDICINE

## 2019-11-19 PROCEDURE — 99999 PR PBB SHADOW E&M-EST. PATIENT-LVL III: CPT | Mod: PBBFAC,,, | Performed by: FAMILY MEDICINE

## 2019-11-19 PROCEDURE — 87502 INFLUENZA DNA AMP PROBE: CPT | Mod: QW,S$GLB,, | Performed by: FAMILY MEDICINE

## 2019-11-19 PROCEDURE — 3075F SYST BP GE 130 - 139MM HG: CPT | Mod: CPTII,S$GLB,, | Performed by: FAMILY MEDICINE

## 2019-11-19 PROCEDURE — 3075F PR MOST RECENT SYSTOLIC BLOOD PRESS GE 130-139MM HG: ICD-10-PCS | Mod: CPTII,S$GLB,, | Performed by: FAMILY MEDICINE

## 2019-11-19 PROCEDURE — 99999 PR PBB SHADOW E&M-EST. PATIENT-LVL III: ICD-10-PCS | Mod: PBBFAC,,, | Performed by: FAMILY MEDICINE

## 2019-11-19 PROCEDURE — 3008F PR BODY MASS INDEX (BMI) DOCUMENTED: ICD-10-PCS | Mod: CPTII,S$GLB,, | Performed by: FAMILY MEDICINE

## 2019-11-19 PROCEDURE — 3079F DIAST BP 80-89 MM HG: CPT | Mod: CPTII,S$GLB,, | Performed by: FAMILY MEDICINE

## 2019-11-19 PROCEDURE — 3079F PR MOST RECENT DIASTOLIC BLOOD PRESSURE 80-89 MM HG: ICD-10-PCS | Mod: CPTII,S$GLB,, | Performed by: FAMILY MEDICINE

## 2019-11-19 PROCEDURE — 99213 PR OFFICE/OUTPT VISIT, EST, LEVL III, 20-29 MIN: ICD-10-PCS | Mod: S$GLB,,, | Performed by: FAMILY MEDICINE

## 2019-11-19 RX ORDER — ONDANSETRON HYDROCHLORIDE 8 MG/1
8 TABLET, FILM COATED ORAL EVERY 8 HOURS PRN
Qty: 12 TABLET | Refills: 0 | Status: SHIPPED | OUTPATIENT
Start: 2019-11-19

## 2019-11-19 RX ORDER — DIPHENOXYLATE HYDROCHLORIDE AND ATROPINE SULFATE 2.5; .025 MG/1; MG/1
1 TABLET ORAL 4 TIMES DAILY PRN
Qty: 12 TABLET | Refills: 0 | Status: SHIPPED | OUTPATIENT
Start: 2019-11-19 | End: 2019-11-29

## 2019-11-19 NOTE — PROGRESS NOTES
Subjective:      Patient ID: Lola Irene is a 54 y.o. female.    Chief Complaint: Nausea; Sore Throat; Cough; and Headache        Patient reports fever, chills, sore throat, headache, diarrhea, vomiting and body aches.  Symptoms started 2 days ago.    Review of Systems   Constitutional: Positive for activity change, appetite change, fatigue and fever.   HENT: Positive for congestion, rhinorrhea, sinus pressure and sore throat.    Respiratory: Positive for cough. Negative for shortness of breath and wheezing.    Gastrointestinal: Positive for abdominal pain, diarrhea, nausea and vomiting.   Musculoskeletal: Positive for myalgias.   Skin: Negative for rash.   Neurological: Positive for headaches.     Past Medical History:   Diagnosis Date    Chronic low back pain     s/p karthik    Hypertension     Smoker      Past Surgical History:   Procedure Laterality Date    karthik      myofacial surg (bone issue)  2001    OOPHORECTOMY Left     ovar cyst    TONSILLECTOMY       Family History   Problem Relation Age of Onset    Hypertension Mother     Hypertension Father     Diabetes Father     Coronary artery disease Father 48    Cataracts Sister     Cataracts Sister      Social History     Socioeconomic History    Marital status: Single     Spouse name: Not on file    Number of children: Not on file    Years of education: Not on file    Highest education level: Not on file   Occupational History    Not on file   Social Needs    Financial resource strain: Not on file    Food insecurity:     Worry: Not on file     Inability: Not on file    Transportation needs:     Medical: Not on file     Non-medical: Not on file   Tobacco Use    Smoking status: Current Every Day Smoker    Smokeless tobacco: Never Used   Substance and Sexual Activity    Alcohol use: Yes     Comment: 2 beers per day    Drug use: Not on file    Sexual activity: Not on file   Lifestyle    Physical activity:     Days per week: Not on file     " Minutes per session: Not on file    Stress: Not on file   Relationships    Social connections:     Talks on phone: Not on file     Gets together: Not on file     Attends [employer] service: Not on file     Active member of club or organization: Not on file     Attends meetings of clubs or organizations: Not on file     Relationship status: Not on file   Other Topics Concern    Not on file   Social History Narrative    Not on file     Review of patient's allergies indicates:   Allergen Reactions    Flagyl [metronidazole hcl]     Norvasc [amlodipine]      Rash, burning  uncertain if was norvasc or wellbutrin    Wellbutrin [bupropion hcl]      Uncertain if was norvasc or wellbutrin       Objective:       /80 (BP Location: Right arm, Patient Position: Sitting)   Pulse 101   Temp 97.3 °F (36.3 °C)   Ht 5' 7" (1.702 m)   Wt 87.1 kg (192 lb 0.3 oz)   SpO2 97%   BMI 30.07 kg/m²   Physical Exam   Constitutional: She appears well-developed and well-nourished. No distress.   HENT:   Head: Normocephalic.   Right Ear: Hearing, tympanic membrane, external ear and ear canal normal.   Left Ear: Hearing, tympanic membrane, external ear and ear canal normal.   Nose: Mucosal edema present. Right sinus exhibits no maxillary sinus tenderness and no frontal sinus tenderness. Left sinus exhibits no maxillary sinus tenderness and no frontal sinus tenderness.   Mouth/Throat: Uvula is midline and mucous membranes are normal. Posterior oropharyngeal erythema present.   Eyes: Pupils are equal, round, and reactive to light. Conjunctivae and EOM are normal.   Cardiovascular: Normal rate, regular rhythm and normal heart sounds.   Pulmonary/Chest: Effort normal and breath sounds normal. No respiratory distress.   Abdominal: Soft. Bowel sounds are normal. There is no tenderness. There is no guarding.   Lymphadenopathy:     She has no cervical adenopathy.   Skin: Skin is warm and dry. She is not diaphoretic.   Psychiatric: She has " a normal mood and affect. Her behavior is normal.   Nursing note and vitals reviewed.    Assessment:     1. Gastroenteritis    2. Fever, unspecified fever cause      Plan:   Gastroenteritis    Fever, unspecified fever cause  -     POCT Influenza A/B Molecular - negative    Other orders  -     diphenoxylate-atropine 2.5-0.025 mg (LOMOTIL) 2.5-0.025 mg per tablet; Take 1 tablet by mouth 4 (four) times daily as needed for Diarrhea.  Dispense: 12 tablet; Refill: 0  -     ondansetron (ZOFRAN) 8 MG tablet; Take 1 tablet (8 mg total) by mouth every 8 (eight) hours as needed for Nausea.  Dispense: 12 tablet; Refill: 0      Medication List with Changes/Refills   New Medications    DIPHENOXYLATE-ATROPINE 2.5-0.025 MG (LOMOTIL) 2.5-0.025 MG PER TABLET    Take 1 tablet by mouth 4 (four) times daily as needed for Diarrhea.    ONDANSETRON (ZOFRAN) 8 MG TABLET    Take 1 tablet (8 mg total) by mouth every 8 (eight) hours as needed for Nausea.   Current Medications    ASPIRIN (ECOTRIN) 81 MG EC TABLET    Take 81 mg by mouth once daily.    OLMESARTAN (BENICAR) 5 MG TAB    Take 1 tablet (5 mg total) by mouth once daily.

## 2020-03-16 ENCOUNTER — PATIENT MESSAGE (OUTPATIENT)
Dept: INTERNAL MEDICINE | Facility: CLINIC | Age: 55
End: 2020-03-16

## 2020-03-16 DIAGNOSIS — I10 ESSENTIAL HYPERTENSION: ICD-10-CM

## 2020-03-17 RX ORDER — OLMESARTAN MEDOXOMIL 5 MG/1
5 TABLET ORAL DAILY
Qty: 30 TABLET | Refills: 5 | Status: SHIPPED | OUTPATIENT
Start: 2020-03-17 | End: 2020-09-09 | Stop reason: SDUPTHER

## 2020-09-09 DIAGNOSIS — I10 ESSENTIAL HYPERTENSION: ICD-10-CM

## 2020-09-14 DIAGNOSIS — I10 ESSENTIAL HYPERTENSION: ICD-10-CM

## 2020-09-14 NOTE — TELEPHONE ENCOUNTER
----- Message from Yue Burris sent at 9/14/2020  9:41 AM CDT -----  Regarding: refill of bp medication  Type:  RX Refill Request    Who Called: Lola Irene   Refill or New Rx:refill  RX Name and Strength:bp medication  How is the patient currently taking it? (ex. 1XDay):  Is this a 30 day or 90 day RX:  Preferred Pharmacy with phone number:  [id number] [place], [place] - [address] AT [facility] [address] [facility]  [address]  [facility] [address]  Phone: [phone] Fax: [phone]      Local or Mail Order:Dr. Rincon   Ordering Provider:  Would the patient rather a call back or a response via [facility]? call  Best Call Back Number:[phone]  Additional Information: pt sent a message last week for a refill and pharmacy sent a request, this is her second day without medication refill

## 2020-09-15 RX ORDER — OLMESARTAN MEDOXOMIL 5 MG/1
5 TABLET ORAL DAILY
Qty: 30 TABLET | Refills: 5 | Status: SHIPPED | OUTPATIENT
Start: 2020-09-15

## 2020-09-15 RX ORDER — OLMESARTAN MEDOXOMIL 5 MG/1
5 TABLET ORAL DAILY
Qty: 30 TABLET | Refills: 1 | Status: SHIPPED | OUTPATIENT
Start: 2020-09-15 | End: 2021-03-22 | Stop reason: SDUPTHER

## 2020-11-05 ENCOUNTER — PATIENT OUTREACH (OUTPATIENT)
Dept: ADMINISTRATIVE | Facility: HOSPITAL | Age: 55
End: 2020-11-05

## 2020-11-05 NOTE — PROGRESS NOTES
[employer] HTN Report  Attempting to contact pt to obtain an updated home BP.Patient does have appointment with  on 11/17/2020 for physical. Care team was updated on 09/04/2020 by Corona Singh to show no primary doctor.  Unable to reach patient at this time. Left voicemail.

## 2021-01-28 ENCOUNTER — PATIENT OUTREACH (OUTPATIENT)
Dept: ADMINISTRATIVE | Facility: HOSPITAL | Age: 56
End: 2021-01-28

## 2021-03-22 DIAGNOSIS — I10 ESSENTIAL HYPERTENSION: ICD-10-CM

## 2021-03-23 ENCOUNTER — PATIENT OUTREACH (OUTPATIENT)
Dept: ADMINISTRATIVE | Facility: HOSPITAL | Age: 56
End: 2021-03-23

## 2021-03-23 RX ORDER — OLMESARTAN MEDOXOMIL 5 MG/1
5 TABLET ORAL DAILY
Qty: 30 TABLET | Refills: 1 | Status: SHIPPED | OUTPATIENT
Start: 2021-03-23 | End: 2021-07-06 | Stop reason: SDUPTHER

## 2021-04-28 ENCOUNTER — PATIENT MESSAGE (OUTPATIENT)
Dept: RESEARCH | Facility: HOSPITAL | Age: 56
End: 2021-04-28

## 2021-07-06 DIAGNOSIS — I10 ESSENTIAL HYPERTENSION: ICD-10-CM

## 2021-07-06 RX ORDER — OLMESARTAN MEDOXOMIL 5 MG/1
5 TABLET ORAL DAILY
Qty: 30 TABLET | Refills: 0 | Status: SHIPPED | OUTPATIENT
Start: 2021-07-06 | End: 2021-08-04

## 2021-09-05 DIAGNOSIS — I10 ESSENTIAL HYPERTENSION: ICD-10-CM

## 2021-09-05 RX ORDER — OLMESARTAN MEDOXOMIL 5 MG/1
TABLET ORAL
Qty: 30 TABLET | Refills: 0 | Status: SHIPPED | OUTPATIENT
Start: 2021-09-05

## 2021-09-23 ENCOUNTER — PATIENT OUTREACH (OUTPATIENT)
Dept: ADMINISTRATIVE | Facility: HOSPITAL | Age: 56
End: 2021-09-23

## 2021-10-04 ENCOUNTER — PATIENT OUTREACH (OUTPATIENT)
Dept: ADMINISTRATIVE | Facility: HOSPITAL | Age: 56
End: 2021-10-04

## 2021-10-04 ENCOUNTER — TELEPHONE (OUTPATIENT)
Dept: INTERNAL MEDICINE | Facility: CLINIC | Age: 56
End: 2021-10-04